# Patient Record
Sex: FEMALE | Race: WHITE | Employment: UNEMPLOYED | URBAN - METROPOLITAN AREA
[De-identification: names, ages, dates, MRNs, and addresses within clinical notes are randomized per-mention and may not be internally consistent; named-entity substitution may affect disease eponyms.]

---

## 2023-04-18 DIAGNOSIS — R00.2 PALPITATIONS: ICD-10-CM

## 2023-04-18 RX ORDER — AMILORIDE HYDROCHLORIDE 5 MG/1
TABLET ORAL
Qty: 90 TABLET | Refills: 0 | Status: SHIPPED | OUTPATIENT
Start: 2023-04-18 | End: 2023-10-11

## 2023-04-18 RX ORDER — AMILORIDE HYDROCHLORIDE 5 MG/1
1 TABLET ORAL DAILY
COMMUNITY
Start: 2013-08-12 | End: 2023-08-30 | Stop reason: ALTCHOICE

## 2023-08-27 PROBLEM — Z90.722 S/P TAH-BSO: Status: ACTIVE | Noted: 2023-08-27

## 2023-08-27 PROBLEM — B02.9 SHINGLES RASH: Status: ACTIVE | Noted: 2023-08-27

## 2023-08-27 PROBLEM — J30.1 RHINITIS DUE TO POLLEN: Status: ACTIVE | Noted: 2023-08-27

## 2023-08-27 PROBLEM — Z90.79 S/P TAH-BSO: Status: ACTIVE | Noted: 2023-08-27

## 2023-08-27 PROBLEM — E03.9 HYPOTHYROIDISM: Status: ACTIVE | Noted: 2023-08-27

## 2023-08-27 PROBLEM — N95.1 VAGINAL DRYNESS, MENOPAUSAL: Status: ACTIVE | Noted: 2023-08-27

## 2023-08-27 PROBLEM — Z90.710 S/P TAH-BSO: Status: ACTIVE | Noted: 2023-08-27

## 2023-08-27 PROBLEM — E55.9 VITAMIN D DEFICIENCY: Status: ACTIVE | Noted: 2023-08-27

## 2023-08-27 PROBLEM — Z12.11 COLON CANCER SCREENING: Status: ACTIVE | Noted: 2023-08-27

## 2023-08-27 RX ORDER — LEVOTHYROXINE SODIUM 25 UG/1
25 TABLET ORAL
COMMUNITY
End: 2023-08-30 | Stop reason: ALTCHOICE

## 2023-08-27 RX ORDER — ERGOCALCIFEROL 1.25 MG/1
50000 CAPSULE ORAL
COMMUNITY
Start: 2018-08-09 | End: 2023-09-03 | Stop reason: SDUPTHER

## 2023-08-27 RX ORDER — TRIAMCINOLONE ACETONIDE 1 MG/G
1 CREAM TOPICAL 2 TIMES DAILY
COMMUNITY
Start: 2023-07-10

## 2023-08-27 RX ORDER — LEVOTHYROXINE SODIUM 112 UG/1
112 TABLET ORAL
COMMUNITY
Start: 2013-04-24

## 2023-08-28 ASSESSMENT — PROMIS GLOBAL HEALTH SCALE
RATE_MENTAL_HEALTH: VERY GOOD
RATE_GENERAL_HEALTH: VERY GOOD
RATE_SOCIAL_SATISFACTION: VERY GOOD
RATE_PHYSICAL_HEALTH: VERY GOOD
RATE_AVERAGE_FATIGUE: MILD
CARRYOUT_PHYSICAL_ACTIVITIES: COMPLETELY
CARRYOUT_SOCIAL_ACTIVITIES: VERY GOOD
RATE_QUALITY_OF_LIFE: VERY GOOD
EMOTIONAL_PROBLEMS: NEVER
RATE_AVERAGE_PAIN: 1

## 2023-08-29 NOTE — PROGRESS NOTES
Subjective   Patient ID: Rebecca Smith is a 58 y.o. 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     She gets her mammograms at [facility] and due in 11/2023    She has intermittent rash on the right foot  She is going to see dermatology       HEALTH  PAP not need s/p BRENDAN/BSO 10/17 and HPV+ hx , 6/2020 was good   Mammo 6/14, 6/15 , 2/16 - and 6/16 -, 9/17 , 9/18, 10/19, 10/2020, 10/2021, 11/2022  MRI breast 9/18 -   BD 8/19 T -1.1 hip L,   COLON 12/14 normal and bx negative , 2/2020 - and Q 5 and father + polyps   EKG 7/13, 8/16, 8/18, 8/19, 8/2021, 8/2023  U/A 8/16, 8/17 , 8/18 , 8/19  FLU 2018, 2019, 8/25/2020, 11/2021, 11/2022  MMR 8/15/19  TDAP 8/3/16  Prevnar never   Pneumovax never   Shingrix recommended and will check coverage   Pfizer CVD 3/23/2021 and 4/13/2021 booster 12/18/2021   Ophth Last seen in 2022. She wears contacts. No glaucoma /MD /cataracts she sees yearly and doing MonoVa.         Review of Systems  All systems negative except those listed in the HPI      Past Medical, Surgical, and Family History reviewed and updated in chart.  Reviewed all medications by prescribing practitioner or clinical pharmacist   (such as prescriptions, OTCs, herbal therapies and supplements) and documented in the medical record      Objective   Visit Vitals  /72 (BP Location: Left arm, Patient Position: Sitting)   Pulse 80   Temp 36.1 °C (96.9 °F) (Temporal)    Body mass index is 28.26 kg/m².     Physical Exam  Vitals reviewed.   Constitutional:       Appearance: Normal appearance.   HENT:      Head: Normocephalic.      Right Ear: Tympanic membrane, ear canal and external ear normal.      Left Ear: Tympanic membrane, ear  canal and external ear normal.      Nose: Nose normal.      Mouth/Throat:      Pharynx: Oropharynx is clear.   Eyes:      Conjunctiva/sclera: Conjunctivae normal.   Cardiovascular:      Rate and Rhythm: Normal rate and regular rhythm.      Pulses: Normal pulses.      Heart sounds: Normal heart sounds.   Pulmonary:      Effort: Pulmonary effort is normal.      Breath sounds: Normal breath sounds.   Chest:      Comments: Breast exam showed bilateral dense breast but otherwise normal  Abdominal:      General: Bowel sounds are normal.      Palpations: Abdomen is soft.   Musculoskeletal:         General: Normal range of motion.      Cervical back: Normal range of motion and neck supple.      Comments: tenderness to IT band, good ROM left LE   Skin:     General: Skin is warm.   Neurological:      General: No focal deficit present.      Mental Status: She is alert and oriented to person, place, and time.   Psychiatric:         Mood and Affect: Mood normal.         Behavior: Behavior normal.         Thought Content: Thought content normal.         Judgment: Judgment normal.       Assessment/Plan   Problem List Items Addressed This Visit    None  Visit Diagnoses       Pain of left hip    -  Primary    Relevant Orders    XR hip left 2 or 3 views            Annual physical completed  Reviewed her labs from 4/2023 and 7/2023  HDL 80,  4/2023   Glucose 83  Normal LFT  Vit D 50  TSH 1.04  HbA1c 5.1  Labs scanned in her chart 8/2023  Labs ordered CBC only     Health Maintenence completed  -  Discussed healthy diet and regular exercise.    -  Physical exam overall unremarkable. Immunizations reviewed and updated accordingly. Healthy lifestyle choices discussed (tobacco avoidance, appropriate alcohol use, avoidance of illicit substances).   -  Patient is wearing seatbelt.   -  Screening lab work ordered as indicated.    -  Age appropriate screening tests reviewed with patient.       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  She complains of left groin pain especially when walking  Gyn felt she needed to speak to her PCP  She had a vein that was causing pain in her leg  She was having pain in the left hip intermittently   She would wake with posterior leg pain in 6/2023.    Recommend and orders placed for Xray left hip for further evaluation 8/2023  Recommend increased stretching     Seen in 7/2023 by ABA Vu for rash of unknown etiology near the arch of her right foot. Rx given for betamethasone cream   She is going to make an appt with dermatology     Her weight is 157 and BMI 28.26. We spent 15 minutes discussing diet and weight loss  Explained goal for BMI to be 25 or less   She is doing Pilates and exercising routinely     Heart palpitations: BP stable 8/2023  Cardiology evaluation was negative in 2013  Stress test and ECHO was negative in 2013  EKG 8/2023 was normal, no LVH or strain pattern noted   Continue amiloride 5 mg daily.   Recommend she monitor her BP periodically and call with elevated readings     I have spent 15 min face to face with this patient discussing their cardiac risk and behavioral therapies of nutrition choices and exercise. We are trying to eliminate habits that are contributing to their cardiac risk.  We agreed on a plan of how they can reduce their current CV risk   The patient's  10 yr CV risk was estimated at 1.6% 8/2023    Elevated lipids in the past: HDL 80,  in 4/2023.   Explained goal for LDL to be less than 100 and ideally less than 70   Discussed diet and exercise for better control of lipid levels     Hypothyroid: TSH 1.04 in 4/2023  Continue BRANDED Synthroid 112 mcg M-Sa and none on Sunday.   She has been on the same regimen for years   US of thyroid 7/24/2021 no nodules, thyroiditis   She is seeing endo routinely     Allergies: Stable   Continue Allegra prn  Recommend she change OTC medications Q 3 months   Recommend  using Flonase NS nightly     Insomnia:  She has difficulty falling to sleep.  Recommend she try Melatonin 5 mg and can increase to 10 mg prn     Varicose veins:  She complains of feeling pressure on her left side when walking   Recommend compression stockings      Carpel tunnel and some ulnar irritation:  Improved with braces and arm rest     Right heel is tight Achilles and stretches discussed:  No plantar fascitis and no heel spur seen   Labs were normal and no anemia and no inflammation markers     Vit D deficiency: Vit D was 50 in 4/2023  Continue scripted Vitamin D 50K UT weekly     Vaginal dryness and menopausal Sx:  Continue Vagifem 2 times a week.    Recommend exercise and carbonated water to help with hot flashes     S/p BRENDAN/BSO in 10/17 with DUB issues and Hx abnormal PAP and HPV+  Continue Premarin 0.625 mg daily   PAP and vaginal 6/2020 was normal   She had perineoplasty in 10/18 and doing well   She saw Dr Callaway in 8/2023 and continue premarin 0.625 mg daily     Mammo 11/2022 at  normal and will repeat in 11/2023  MRI 9/18 was negative. She has completed the 2 year study.   Breast exam showed bilateral dense breast but otherwise normal 8/2023     Colonoscopy was normal in 2/2020 and still needs repeat in 5 year  FmHx +polyps     Ophth:  Last seen in 2022. She wears contacts.   No glaucoma /MD /cataracts she sees yearly and doing MonoVa.  She will have her next eye exam faxed to my office in order to update her medical records.    FLU 2018, 2019, 8/25/2020, 11/2021, 11/2022  MMR 8/15/19  TDAP 8/3/16  Prevnar never   Pneumovax never   Shingrix recommended and will check coverage   Pfizer CVD 3/23/2021 and 4/13/2021 booster 12/18/2021     Some elements in the chart were copied from Dr. Salazar's last office visit with patient.   Notes have been updated where appropriate, and reflect my current medical decision making from today.     RTC in 1 year for CPE or sooner if needed     Scribe Attestation  By  signing my name below, I, Anita Dickens   attest that this documentation has been prepared under the direction and in the presence of Danisha Salazar MD.

## 2023-08-30 ENCOUNTER — OFFICE VISIT (OUTPATIENT)
Dept: PRIMARY CARE | Facility: CLINIC | Age: 58
End: 2023-08-30
Payer: COMMERCIAL

## 2023-08-30 VITALS
HEIGHT: 63 IN | SYSTOLIC BLOOD PRESSURE: 124 MMHG | OXYGEN SATURATION: 99 % | WEIGHT: 157 LBS | TEMPERATURE: 96.9 F | BODY MASS INDEX: 27.82 KG/M2 | DIASTOLIC BLOOD PRESSURE: 72 MMHG | HEART RATE: 80 BPM

## 2023-08-30 DIAGNOSIS — K59.00 CONSTIPATION, UNSPECIFIED CONSTIPATION TYPE: ICD-10-CM

## 2023-08-30 DIAGNOSIS — M25.552 PAIN OF LEFT HIP: Primary | ICD-10-CM

## 2023-08-30 DIAGNOSIS — Z90.710 S/P TAH-BSO: ICD-10-CM

## 2023-08-30 DIAGNOSIS — M25.552 ACUTE HIP PAIN, LEFT: ICD-10-CM

## 2023-08-30 DIAGNOSIS — Z00.00 HEALTHCARE MAINTENANCE: ICD-10-CM

## 2023-08-30 DIAGNOSIS — Z90.79 S/P TAH-BSO: ICD-10-CM

## 2023-08-30 DIAGNOSIS — N95.1 VAGINAL DRYNESS, MENOPAUSAL: ICD-10-CM

## 2023-08-30 DIAGNOSIS — E03.9 HYPOTHYROIDISM, UNSPECIFIED TYPE: ICD-10-CM

## 2023-08-30 DIAGNOSIS — Z90.722 S/P TAH-BSO: ICD-10-CM

## 2023-08-30 DIAGNOSIS — Z00.00 HEALTHCARE MAINTENANCE: Primary | ICD-10-CM

## 2023-08-30 PROBLEM — Z86.39 HISTORY OF HYPOKALEMIA: Status: ACTIVE | Noted: 2023-08-30

## 2023-08-30 PROBLEM — L30.1 DYSHIDROSIS: Status: ACTIVE | Noted: 2023-08-30

## 2023-08-30 PROCEDURE — 93000 ELECTROCARDIOGRAM COMPLETE: CPT | Performed by: INTERNAL MEDICINE

## 2023-08-30 PROCEDURE — 99396 PREV VISIT EST AGE 40-64: CPT | Performed by: INTERNAL MEDICINE

## 2023-08-30 PROCEDURE — 1036F TOBACCO NON-USER: CPT | Performed by: INTERNAL MEDICINE

## 2023-08-30 RX ORDER — CETIRIZINE HYDROCHLORIDE 10 MG/1
10 TABLET ORAL DAILY
COMMUNITY
Start: 2010-06-12

## 2023-09-01 ENCOUNTER — LAB (OUTPATIENT)
Dept: LAB | Facility: LAB | Age: 58
End: 2023-09-01
Payer: COMMERCIAL

## 2023-09-01 DIAGNOSIS — Z00.00 HEALTHCARE MAINTENANCE: ICD-10-CM

## 2023-09-01 LAB
BASOPHILS (10*3/UL) IN BLOOD BY AUTOMATED COUNT: 0.02 X10E9/L (ref 0–0.1)
BASOPHILS/100 LEUKOCYTES IN BLOOD BY AUTOMATED COUNT: 0.4 % (ref 0–2)
EOSINOPHILS (10*3/UL) IN BLOOD BY AUTOMATED COUNT: 0.05 X10E9/L (ref 0–0.7)
EOSINOPHILS/100 LEUKOCYTES IN BLOOD BY AUTOMATED COUNT: 1 % (ref 0–6)
ERYTHROCYTE DISTRIBUTION WIDTH (RATIO) BY AUTOMATED COUNT: 11.6 % (ref 11.5–14.5)
ERYTHROCYTE MEAN CORPUSCULAR HEMOGLOBIN CONCENTRATION (G/DL) BY AUTOMATED: 33.6 G/DL (ref 32–36)
ERYTHROCYTE MEAN CORPUSCULAR VOLUME (FL) BY AUTOMATED COUNT: 94 FL (ref 80–100)
ERYTHROCYTES (10*6/UL) IN BLOOD BY AUTOMATED COUNT: 4.25 X10E12/L (ref 4–5.2)
HEMATOCRIT (%) IN BLOOD BY AUTOMATED COUNT: 39.9 % (ref 36–46)
HEMOGLOBIN (G/DL) IN BLOOD: 13.4 G/DL (ref 12–16)
IMMATURE GRANULOCYTES/100 LEUKOCYTES IN BLOOD BY AUTOMATED COUNT: 0.2 % (ref 0–0.9)
LEUKOCYTES (10*3/UL) IN BLOOD BY AUTOMATED COUNT: 5.2 X10E9/L (ref 4.4–11.3)
LYMPHOCYTES (10*3/UL) IN BLOOD BY AUTOMATED COUNT: 2.44 X10E9/L (ref 1.2–4.8)
LYMPHOCYTES/100 LEUKOCYTES IN BLOOD BY AUTOMATED COUNT: 46.9 % (ref 13–44)
MONOCYTES (10*3/UL) IN BLOOD BY AUTOMATED COUNT: 0.34 X10E9/L (ref 0.1–1)
MONOCYTES/100 LEUKOCYTES IN BLOOD BY AUTOMATED COUNT: 6.5 % (ref 2–10)
NEUTROPHILS (10*3/UL) IN BLOOD BY AUTOMATED COUNT: 2.34 X10E9/L (ref 1.2–7.7)
NEUTROPHILS/100 LEUKOCYTES IN BLOOD BY AUTOMATED COUNT: 45 % (ref 40–80)
PLATELETS (10*3/UL) IN BLOOD AUTOMATED COUNT: 221 X10E9/L (ref 150–450)

## 2023-09-01 PROCEDURE — 36415 COLL VENOUS BLD VENIPUNCTURE: CPT

## 2023-09-01 PROCEDURE — 85025 COMPLETE CBC W/AUTO DIFF WBC: CPT

## 2023-09-03 DIAGNOSIS — E55.9 VITAMIN D DEFICIENCY: Primary | ICD-10-CM

## 2023-09-03 RX ORDER — ERGOCALCIFEROL 1.25 MG/1
50000 CAPSULE ORAL
Qty: 6 CAPSULE | Refills: 3 | Status: SHIPPED | OUTPATIENT
Start: 2023-09-03 | End: 2024-09-02

## 2023-10-11 DIAGNOSIS — R00.2 PALPITATIONS: ICD-10-CM

## 2023-10-11 RX ORDER — AMILORIDE HYDROCHLORIDE 5 MG/1
TABLET ORAL
Qty: 90 TABLET | Refills: 0 | Status: SHIPPED | OUTPATIENT
Start: 2023-10-11 | End: 2023-12-06 | Stop reason: SDUPTHER

## 2023-12-06 DIAGNOSIS — R00.2 PALPITATIONS: ICD-10-CM

## 2023-12-06 RX ORDER — AMILORIDE HYDROCHLORIDE 5 MG/1
5 TABLET ORAL DAILY
Qty: 90 TABLET | Refills: 1 | Status: SHIPPED | OUTPATIENT
Start: 2023-12-06

## 2023-12-28 ENCOUNTER — CLINICAL SUPPORT (OUTPATIENT)
Dept: PRIMARY CARE | Facility: CLINIC | Age: 58
End: 2023-12-28
Payer: COMMERCIAL

## 2023-12-28 DIAGNOSIS — Z23 ENCOUNTER FOR IMMUNIZATION: Primary | ICD-10-CM

## 2023-12-28 PROCEDURE — 90471 IMMUNIZATION ADMIN: CPT | Performed by: INTERNAL MEDICINE

## 2023-12-28 PROCEDURE — 90715 TDAP VACCINE 7 YRS/> IM: CPT | Performed by: INTERNAL MEDICINE

## 2024-09-01 DIAGNOSIS — Z11.59 ENCOUNTER FOR HEPATITIS C SCREENING TEST FOR LOW RISK PATIENT: ICD-10-CM

## 2024-09-01 DIAGNOSIS — Z00.00 HEALTHCARE MAINTENANCE: Primary | ICD-10-CM

## 2024-09-03 NOTE — PROGRESS NOTES
Subjective   Patient ID: Rebecca Smith is a 59 y.o. 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  She is planning on doing the Shingrix vaccine     She had a fever this past weekend that started on Saturday 8/31/24  Her fever has resolved but she has a lingering cough   She stopped taking her allergy medicine when she started cold medicine      She is sleeping well. She is taking Mg glycinate at night 1-2 tablets     She has intermittent pain in her knee   She is going to the gym and doing weights   Her legs and arms are getting larger and she does not want that, she wants to look leaner.      HEALTH  PAP s/p BRENDAN/BSO 10/17, 6/20 normal and no longer needs to repeat   Mammo 6/15, 2/16, 6/16, 9/17, 9/18, 10/19, 10/20, 10/21, 11/22, 11/23, gyn orders   MRI breast 9/18 -   BD 8/19 T -1.1,   Colon 12/14 nl, 2/20 nl and Q 5 due to history   -- Her father had polyps   EKG 7/13, 8/16, 8/18, 8/19, 8/21, 8/23  U/A 8/16, 8/17, 8/18, 8/19  Hep C ordered 8/24  FLU 2018, 2019, 8/20, 11/21, 11/22, 11/23 at work  MMR 8/15/19  TDAP 8/3/16, 12/28/2023   Prevnar never   Pneumovax never   Shingrix recommended and will check coverage   Pfizer CVD 3/23/2021 and 4/13/2021 booster 12/18/2021   Ophth Last seen in 2022. She wears contacts. No glaucoma /MD /cataracts she sees yearly and doing MonoVa.        Review of Systems  All systems negative except those listed in the HPI      Past Medical, Surgical, and Family History reviewed and updated in chart.  Reviewed all medications by prescribing practitioner or clinical pharmacist   (such as prescriptions, OTCs, herbal therapies and supplements) and documented in the medical record      Objective   Visit Vitals  /70 (BP Location: Left arm, Patient Position: Sitting, BP Cuff Size: Adult)   Pulse 64   Temp 36.6 °C  (97.8 °F) (Skin)   Resp 16    Body mass index is 26.39 kg/m².      Physical Exam  Vitals reviewed.   Constitutional:       Appearance: Normal appearance.   HENT:      Head: Normocephalic.      Right Ear: Tympanic membrane, ear canal and external ear normal.      Left Ear: Tympanic membrane, ear canal and external ear normal.      Nose: Nose normal.      Mouth/Throat:      Pharynx: Oropharynx is clear.   Eyes:      Conjunctiva/sclera: Conjunctivae normal.   Cardiovascular:      Rate and Rhythm: Normal rate and regular rhythm.      Pulses: Normal pulses.      Heart sounds: Normal heart sounds.   Pulmonary:      Effort: Pulmonary effort is normal.      Breath sounds: Normal breath sounds.   Chest:      Comments: Breast exam showed bilateral dense breast but otherwise normal   Abdominal:      General: Bowel sounds are normal.      Palpations: Abdomen is soft.      Comments: No retained stool    Musculoskeletal:         General: Normal range of motion.      Cervical back: Normal range of motion and neck supple.      Comments: Synovial cyst right knee: On exam: full ROM, Baker's cyst noted posteriorly, no warmth, mild effusion    Skin:     General: Skin is warm.   Neurological:      General: No focal deficit present.      Mental Status: She is alert and oriented to person, place, and time.   Psychiatric:         Mood and Affect: Mood normal.         Behavior: Behavior normal.         Thought Content: Thought content normal.         Judgment: Judgment normal.       Assessment/Plan   Problem List Items Addressed This Visit       Constipation    Diverticular disease of colon    RESOLVED: Diverticulosis    Dyshidrosis    Hypothyroidism    Palpitations    Vaginal dryness, menopausal     Other Visit Diagnoses       Healthcare maintenance    -  Primary    Synovial cyst of right popliteal space               Annual physical completed  Annual labs ordered      Health Maintenence completed  -  Discussed healthy diet and regular  exercise.    -  Physical exam overall unremarkable. Immunizations reviewed and updated accordingly. Healthy lifestyle choices discussed (tobacco avoidance, appropriate alcohol use, avoidance of illicit substances).   -  Patient is wearing seatbelt.   -  Screening lab work ordered as indicated.    -  Age appropriate screening tests reviewed with patient.       She is  with 2 children. She denies previous history of tobacco use       She is a grandmother to a baby girl named Yogesh      She is going to the  9/18/24      Seen in  on 12/25/23 for left thumb injury after dog bite: healed   Rx given for Augmentin and mupirocin gina   TDAP updated 12/23     She had a fever this past weekend that started on Saturday 8/31/24: On exam: nothing noted, no infection 8/24  Her fever has resolved but she has a lingering cough    She stopped taking her allergy medicine when she started cold medicine      Her weight is 149 and BMI 26.39. We spent 15 minutes discussing diet and weight loss  Explained goal for BMI to be 25 or less   She is doing Pilates and exercising routinely      Heart palpitations: BP stable    Continue amiloride 5 mg daily.    Cardiology evaluation was negative in 2013  Stress test and ECHO was negative in 2013  EKG 8/2023 was normal, no LVH or strain pattern noted   Recommend she monitor her BP periodically and call with elevated readings      I have spent 15 min face to face with this patient discussing their cardiac risk   We discussed the patients cardiovascular risk. If needed, lifestyle modifications recommended including: behavioral therapies of nutrition choices, exercise and eliminate habits that are contributing to their cardiac risk. We agreed to a plan to decrease his cardiovascular risks. Discussed ASA. Reviewed Guidelines and approved recommendations made to patient.   The patient's 10 yr CV risk was estimated at : ACO score 2/6 IO 8/24      Elevated lipids in the past: Labs ordered and we  will adjust if indicated  9/24  Explained goal for LDL to be less than 100 and ideally less than 70   Recommend she look into a plant based/ whole foods diet      Hypothyroid: Labs ordered and we will adjust if indicated  9/24  Continue BRANDED Synthroid 112 mcg M-Sa and none on Sunday.   She has been on the same regimen for years   US of thyroid 7/24/2021 no nodules, thyroiditis   She is following endo routinely (Dr Che)      Allergies: Stable   Continue Allegra prn  Recommend she change OTC medications Q 3 months   Recommend using Flonase NS nightly     Varicose veins:  She complains of feeling pressure on her left side when walking   Recommend compression stockings daily      Insomnia:  She is sleeping well. She is taking Mg glycinate at night 1-2 tablets      Constipation: On exam: no retained stool noted 9/24   Discussed foods to help with increased fiber   Recommend she begin Metamucil 1 scoop daily   Avoid fake sugars and red meat. Avoid processed meats  Continue Mg glycinate at night   She can look into [place]     Left hip pain:    Xray left hip 9/2023 Unremarkable left hip radiographs.    Recommend stretching routinely     Synovial cyst right knee: On exam: full ROM, Baker's cyst noted posteriorly, no warmth, mild effusion 8/24  She is going to the gym and doing weights. Her legs and arms are getting larger and she does not want that, she wants to look leaner.  Recommend Aleve 1 tablet prn knee pain, she is not to take this long term   Avoid kneeling.   Recommend walking, Pilate's or chair Yoga for exercise instead of weight lifting      Carpel tunnel and some ulnar irritation:  Improved with braces and arm rest      Right heel is tight Achilles and stretches discussed:  No plantar fascitis and no heel spur seen   Labs were normal and no anemia and no inflammation markers      Vit D deficiency: Vit D was 50 in 4/2023  Discontinue scripted Vitamin D due to concerns for bone loss with higher  doses  Recommend she begin OTC Vitamin D3 2000 UT daily      Vaginal dryness and menopausal Sx:  Continue Vagifem 2 times a week.    Recommend exercise and carbonated water to help with hot flashes      S/p BRENDAN/BSO in 10/17 with DUB issues and Hx abnormal PAP and HPV+  Continue Premarin 0.625 mg daily   PAP and vaginal 6/2020 was normal   She had perineoplasty in 10/18 and doing well   She saw Dr Callaway in 8/24 and continue premarin 0.625 mg daily      Mammo 11/2023 at  normal. Gyn orders   MRI 9/18 was negative. She has completed the 2 year study.   Breast exam showed bilateral dense breast but otherwise normal 8/24     Colonoscopy was normal in 2/2020 and still needs repeat in 5 year  FmHx +polyps      Ophth:  Last seen in 2022. She wears contacts.   No glaucoma /MD /cataracts she sees yearly and doing Mono[facility].  She will have her next eye exam faxed to my office in order to update her medical records.     Hep C ordered 8/24  FLU 2018, 2019, 8/20, 11/21, 11/22, 11/23 at work  MMR 8/15/19  TDAP 8/3/16, 12/28/2023   Prevnar never   Pneumovax never   Shingrix recommended and will check coverage   Pfizer CVD 3/23/2021 and 4/13/2021 booster 12/18/2021       Some elements in the chart were copied from Dr. Salazar's last office visit with patient.   Notes have been updated where appropriate, and reflect my current medical decision making from today.      RTC in 1 year for CPE or sooner if needed  (CPE due 9/25, last cpe 9/4/24)       Scribe Attestation  By signing my name below, I, Chela Bertrand , Scribe   attest that this documentation has been prepared under the direction and in the presence of Danisha Salazar MD.

## 2024-09-04 ENCOUNTER — APPOINTMENT (OUTPATIENT)
Dept: PRIMARY CARE | Facility: CLINIC | Age: 59
End: 2024-09-04
Payer: COMMERCIAL

## 2024-09-04 VITALS
DIASTOLIC BLOOD PRESSURE: 70 MMHG | OXYGEN SATURATION: 99 % | BODY MASS INDEX: 26.4 KG/M2 | TEMPERATURE: 97.8 F | RESPIRATION RATE: 16 BRPM | SYSTOLIC BLOOD PRESSURE: 120 MMHG | WEIGHT: 149 LBS | HEART RATE: 64 BPM | HEIGHT: 63 IN

## 2024-09-04 DIAGNOSIS — N95.1 VAGINAL DRYNESS, MENOPAUSAL: ICD-10-CM

## 2024-09-04 DIAGNOSIS — K57.90 DIVERTICULOSIS: ICD-10-CM

## 2024-09-04 DIAGNOSIS — L30.1 DYSHIDROSIS: ICD-10-CM

## 2024-09-04 DIAGNOSIS — Z00.00 HEALTHCARE MAINTENANCE: Primary | ICD-10-CM

## 2024-09-04 DIAGNOSIS — K59.00 CONSTIPATION, UNSPECIFIED CONSTIPATION TYPE: ICD-10-CM

## 2024-09-04 DIAGNOSIS — K57.30 DIVERTICULAR DISEASE OF COLON: ICD-10-CM

## 2024-09-04 DIAGNOSIS — R00.2 PALPITATIONS: ICD-10-CM

## 2024-09-04 DIAGNOSIS — M71.21 SYNOVIAL CYST OF RIGHT POPLITEAL SPACE: ICD-10-CM

## 2024-09-04 DIAGNOSIS — E03.9 HYPOTHYROIDISM, UNSPECIFIED TYPE: ICD-10-CM

## 2024-09-04 PROCEDURE — 3008F BODY MASS INDEX DOCD: CPT | Performed by: INTERNAL MEDICINE

## 2024-09-04 PROCEDURE — 1036F TOBACCO NON-USER: CPT | Performed by: INTERNAL MEDICINE

## 2024-09-04 PROCEDURE — 99396 PREV VISIT EST AGE 40-64: CPT | Performed by: INTERNAL MEDICINE

## 2024-09-04 ASSESSMENT — PATIENT HEALTH QUESTIONNAIRE - PHQ9
SUM OF ALL RESPONSES TO PHQ9 QUESTIONS 1 AND 2: 0
1. LITTLE INTEREST OR PLEASURE IN DOING THINGS: NOT AT ALL
2. FEELING DOWN, DEPRESSED OR HOPELESS: NOT AT ALL

## 2024-09-04 NOTE — PATIENT INSTRUCTIONS
It was a pleasure to see you today.  I would like to remind you about importance of a healthy lifestyle in order to improve your well-being and live longer. Try to engage in physical activities for at least 150 minutes per week.  Eat about 10 servings of fruits and vegetables daily. My advice is 2 servings of fruits and 8 servings of vegetables. For vegetables choose at least half of them green and at least half of them fresh.  Please avoid sugar, salt, fried food and saturated fat.  Weight loss is advised. Target BMI: below 25. Please follow low carbohydrate diet and daily exercise routine for at least 30 minutes. Nutritional consultation is available, please let me know if you are interested. I will be happy to discuss details with you if interested.   Have a good day and stay well.      The ability to age comfortably depends on how you invest in your body.   Include physical activity in your daily routine. Physical activity increases blood flow to your whole body, including your brain. ...  Eat a healthy diet. A heart-healthy diet may benefit your brain.   Stay mentally active. Be social.   Treat cardiovascular disease.  No smoking, excessive EtOH intake or illicit drug use.

## 2024-09-13 ENCOUNTER — LAB (OUTPATIENT)
Dept: LAB | Facility: LAB | Age: 59
End: 2024-09-13
Payer: COMMERCIAL

## 2024-09-13 DIAGNOSIS — Z00.00 HEALTHCARE MAINTENANCE: ICD-10-CM

## 2024-09-13 DIAGNOSIS — Z11.59 ENCOUNTER FOR HEPATITIS C SCREENING TEST FOR LOW RISK PATIENT: ICD-10-CM

## 2024-09-13 LAB
ALBUMIN SERPL BCP-MCNC: 4.1 G/DL (ref 3.4–5)
ALP SERPL-CCNC: 53 U/L (ref 33–110)
ALT SERPL W P-5'-P-CCNC: 13 U/L (ref 7–45)
ANION GAP SERPL CALC-SCNC: 12 MMOL/L (ref 10–20)
AST SERPL W P-5'-P-CCNC: 11 U/L (ref 9–39)
BILIRUB SERPL-MCNC: 0.4 MG/DL (ref 0–1.2)
BUN SERPL-MCNC: 14 MG/DL (ref 6–23)
CALCIUM SERPL-MCNC: 9.1 MG/DL (ref 8.6–10.6)
CHLORIDE SERPL-SCNC: 103 MMOL/L (ref 98–107)
CHOLEST SERPL-MCNC: 214 MG/DL (ref 0–199)
CHOLESTEROL/HDL RATIO: 2.5
CO2 SERPL-SCNC: 29 MMOL/L (ref 21–32)
CREAT SERPL-MCNC: 0.85 MG/DL (ref 0.5–1.05)
EGFRCR SERPLBLD CKD-EPI 2021: 80 ML/MIN/1.73M*2
ERYTHROCYTE [DISTWIDTH] IN BLOOD BY AUTOMATED COUNT: 11.7 % (ref 11.5–14.5)
EST. AVERAGE GLUCOSE BLD GHB EST-MCNC: 103 MG/DL
GLUCOSE SERPL-MCNC: 96 MG/DL (ref 74–99)
HBA1C MFR BLD: 5.2 %
HCT VFR BLD AUTO: 41.3 % (ref 36–46)
HCV AB SER QL: NONREACTIVE
HDLC SERPL-MCNC: 85.4 MG/DL
HGB BLD-MCNC: 13.6 G/DL (ref 12–16)
LDLC SERPL CALC-MCNC: 112 MG/DL
MCH RBC QN AUTO: 30.9 PG (ref 26–34)
MCHC RBC AUTO-ENTMCNC: 32.9 G/DL (ref 32–36)
MCV RBC AUTO: 94 FL (ref 80–100)
NON HDL CHOLESTEROL: 129 MG/DL (ref 0–149)
NRBC BLD-RTO: 0 /100 WBCS (ref 0–0)
PLATELET # BLD AUTO: 272 X10*3/UL (ref 150–450)
POTASSIUM SERPL-SCNC: 3.9 MMOL/L (ref 3.5–5.3)
PROT SERPL-MCNC: 6.9 G/DL (ref 6.4–8.2)
RBC # BLD AUTO: 4.4 X10*6/UL (ref 4–5.2)
SODIUM SERPL-SCNC: 140 MMOL/L (ref 136–145)
TRIGL SERPL-MCNC: 84 MG/DL (ref 0–149)
VLDL: 17 MG/DL (ref 0–40)
WBC # BLD AUTO: 6.6 X10*3/UL (ref 4.4–11.3)

## 2024-09-13 PROCEDURE — 83036 HEMOGLOBIN GLYCOSYLATED A1C: CPT

## 2024-09-13 PROCEDURE — 85027 COMPLETE CBC AUTOMATED: CPT

## 2024-09-13 PROCEDURE — 36415 COLL VENOUS BLD VENIPUNCTURE: CPT

## 2024-09-13 PROCEDURE — 80053 COMPREHEN METABOLIC PANEL: CPT

## 2024-09-13 PROCEDURE — 80061 LIPID PANEL: CPT

## 2024-09-13 PROCEDURE — 86803 HEPATITIS C AB TEST: CPT

## 2024-09-24 DIAGNOSIS — R00.2 PALPITATIONS: ICD-10-CM

## 2024-09-24 RX ORDER — AMILORIDE HYDROCHLORIDE 5 MG/1
5 TABLET ORAL DAILY
Qty: 90 TABLET | Refills: 1 | Status: SHIPPED | OUTPATIENT
Start: 2024-09-24

## 2025-06-02 DIAGNOSIS — R00.2 PALPITATIONS: ICD-10-CM

## 2025-06-02 RX ORDER — AMILORIDE HYDROCHLORIDE 5 MG/1
5 TABLET ORAL DAILY
Qty: 90 TABLET | Refills: 1 | Status: SHIPPED | OUTPATIENT
Start: 2025-06-02

## 2025-08-29 DIAGNOSIS — R00.2 PALPITATIONS: ICD-10-CM

## 2025-08-29 RX ORDER — AMILORIDE HYDROCHLORIDE 5 MG/1
5 TABLET ORAL DAILY
Qty: 90 TABLET | Refills: 1 | Status: SHIPPED | OUTPATIENT
Start: 2025-08-29

## 2025-09-04 ENCOUNTER — APPOINTMENT (OUTPATIENT)
Dept: PRIMARY CARE | Facility: CLINIC | Age: 60
End: 2025-09-04
Payer: COMMERCIAL

## 2025-09-04 PROBLEM — R03.0 ELEVATED BLOOD PRESSURE READING WITHOUT DIAGNOSIS OF HYPERTENSION: Status: ACTIVE | Noted: 2025-09-04

## 2025-09-04 ASSESSMENT — PATIENT HEALTH QUESTIONNAIRE - PHQ9
1. LITTLE INTEREST OR PLEASURE IN DOING THINGS: NOT AT ALL
SUM OF ALL RESPONSES TO PHQ9 QUESTIONS 1 AND 2: 0
2. FEELING DOWN, DEPRESSED OR HOPELESS: NOT AT ALL

## 2026-09-10 ENCOUNTER — APPOINTMENT (OUTPATIENT)
Dept: PRIMARY CARE | Facility: CLINIC | Age: 61
End: 2026-09-10
Payer: COMMERCIAL